# Patient Record
Sex: FEMALE | Race: WHITE | NOT HISPANIC OR LATINO | Employment: UNEMPLOYED | URBAN - METROPOLITAN AREA
[De-identification: names, ages, dates, MRNs, and addresses within clinical notes are randomized per-mention and may not be internally consistent; named-entity substitution may affect disease eponyms.]

---

## 2017-02-08 ENCOUNTER — OFFICE VISIT (OUTPATIENT)
Dept: RETAIL CLINIC | Facility: CLINIC | Age: 32
End: 2017-02-08

## 2017-02-08 VITALS
SYSTOLIC BLOOD PRESSURE: 104 MMHG | TEMPERATURE: 98.2 F | BODY MASS INDEX: 24.08 KG/M2 | RESPIRATION RATE: 18 BRPM | DIASTOLIC BLOOD PRESSURE: 68 MMHG | HEIGHT: 67 IN | HEART RATE: 56 BPM | OXYGEN SATURATION: 99 % | WEIGHT: 153.4 LBS

## 2017-02-08 DIAGNOSIS — J32.9 SINUSITIS, UNSPECIFIED CHRONICITY, UNSPECIFIED LOCATION: Primary | ICD-10-CM

## 2017-02-08 DIAGNOSIS — J40 BRONCHITIS: ICD-10-CM

## 2017-02-08 PROCEDURE — 99202 OFFICE O/P NEW SF 15 MIN: CPT | Performed by: NURSE PRACTITIONER

## 2017-02-08 RX ORDER — PREDNISONE 20 MG/1
20 TABLET ORAL 2 TIMES DAILY
Qty: 6 TABLET | Refills: 0 | Status: SHIPPED | OUTPATIENT
Start: 2017-02-08 | End: 2017-02-11

## 2017-02-08 RX ORDER — DOXYCYCLINE 100 MG/1
100 TABLET ORAL 2 TIMES DAILY
Qty: 14 TABLET | Refills: 0 | Status: SHIPPED | OUTPATIENT
Start: 2017-02-08 | End: 2017-02-09

## 2017-02-08 NOTE — PROGRESS NOTES
Subjective   July Carrington is a 31 y.o. female.   Chief Complaint   Patient presents with   • Earache     bilateral   • Cough     dry   • Sore Throat     swollen gland on the right side   • Nasal Congestion   • Headache     History of Present Illness Complains of sore throat , both ears hurt, cough, congestion, headache for 1 week.  Has taken several different OTC cold/cough meds which haven't helped.    The following portions of the patient's history were reviewed and updated as appropriate: allergies, current medications, past medical history, past social history, past surgical history and problem list.    Review of Systems   Constitutional: Positive for chills and fever (subjective).   HENT: Positive for congestion, ear pain and sore throat.    Respiratory: Positive for cough.    Neurological: Positive for headaches.       Objective   Vitals:    02/08/17 1543   BP: 104/68   Pulse: 56   Resp: 18   Temp: 98.2 °F (36.8 °C)   SpO2: 99%     Physical Exam   Constitutional: She is oriented to person, place, and time. She appears well-developed and well-nourished. She appears ill (mildly).   HENT:   Right Ear: Tympanic membrane is injected.   Left Ear: Tympanic membrane normal.   Nose: Right sinus exhibits no maxillary sinus tenderness and no frontal sinus tenderness. Left sinus exhibits no maxillary sinus tenderness and no frontal sinus tenderness.   Mouth/Throat: Posterior oropharyngeal erythema (thick yellow PND present) present. Tonsils are 0 on the right. Tonsils are 0 on the left.   Eyes: Conjunctivae are normal.   Cardiovascular: Normal rate, regular rhythm and normal heart sounds.    Pulmonary/Chest: No respiratory distress. She has no decreased breath sounds. She has wheezes in the right upper field.   Lymphadenopathy:   Kraig right shotty adenopathy     Neurological: She is alert and oriented to person, place, and time.   Skin: Skin is warm and dry.           Assessment/Plan   July was seen today for  earache, cough, sore throat, nasal congestion and headache.    Diagnoses and all orders for this visit:    Sinusitis, unspecified chronicity, unspecified location    Bronchitis    Other orders  -     doxycycline (ADOXA) 100 MG tablet; Take 1 tablet by mouth 2 (Two) Times a Day for 7 days.  -     predniSONE (DELTASONE) 20 MG tablet; Take 1 tablet by mouth 2 (Two) Times a Day for 3 days.                   Home care instruction reviewed with patient and/or caregiver who acknowledges understanding, questions answered.    Arlin Cohen, APRN

## 2017-02-08 NOTE — PATIENT INSTRUCTIONS
Acute Bronchitis  Bronchitis is when the airways that extend from the windpipe into the lungs get red, puffy, and painful (inflamed). Bronchitis often causes thick spit (mucus) to develop. This leads to a cough. A cough is the most common symptom of bronchitis.  In acute bronchitis, the condition usually begins suddenly and goes away over time (usually in 2 weeks). Smoking, allergies, and asthma can make bronchitis worse. Repeated episodes of bronchitis may cause more lung problems.  HOME CARE  · Rest.  · Drink enough fluids to keep your pee (urine) clear or pale yellow (unless you need to limit fluids as told by your doctor).  · Only take over-the-counter or prescription medicines as told by your doctor.  · Avoid smoking and secondhand smoke. These can make bronchitis worse. If you are a smoker, think about using nicotine gum or skin patches. Quitting smoking will help your lungs heal faster.  · Reduce the chance of getting bronchitis again by:    Washing your hands often.    Avoiding people with cold symptoms.    Trying not to touch your hands to your mouth, nose, or eyes.  · Follow up with your doctor as told.  GET HELP IF:  Your symptoms do not improve after 1 week of treatment. Symptoms include:  · Cough.  · Fever.  · Coughing up thick spit.  · Body aches.  · Chest congestion.  · Chills.  · Shortness of breath.  · Sore throat.  GET HELP RIGHT AWAY IF:   · You have an increased fever.  · You have chills.  · You have severe shortness of breath.  · You have bloody thick spit (sputum).  · You throw up (vomit) often.  · You lose too much body fluid (dehydration).  · You have a severe headache.  · You faint.  MAKE SURE YOU:   · Understand these instructions.  · Will watch your condition.  · Will get help right away if you are not doing well or get worse.     This information is not intended to replace advice given to you by your health care provider. Make sure you discuss any questions you have with your health care  provider.     Document Released: 06/05/2009 Document Revised: 08/20/2014 Document Reviewed: 06/10/2014  [employer] Interactive Patient Education ©2016 [employer]  Sinusitis, Adult  Sinusitis is redness, soreness, and puffiness (inflammation) of the air pockets in the bones of your face (sinuses). The redness, soreness, and puffiness can cause air and mucus to get trapped in your sinuses. This can allow germs to grow and cause an infection.   HOME CARE   · Drink enough fluids to keep your pee (urine) clear or pale yellow.  · Use a humidifier in your home.  · Run a hot shower to create steam in the bathroom. Sit in the bathroom with the door closed. Breathe in the steam 3-4 times a day.  · Put a warm, moist washcloth on your face 3-4 times a day, or as told by your doctor.  · Use salt water sprays (saline sprays) to wet the thick fluid in your nose. This can help the sinuses drain.  · Only take medicine as told by your doctor.  GET HELP RIGHT AWAY IF:   · Your pain gets worse.  · You have very bad headaches.  · You are sick to your stomach (nauseous).  · You throw up (vomit).  · You are very sleepy (drowsy) all the time.  · Your face is puffy (swollen).  · Your vision changes.  · You have a stiff neck.  · You have trouble breathing.  MAKE SURE YOU:   · Understand these instructions.  · Will watch your condition.  · Will get help right away if you are not doing well or get worse.     This information is not intended to replace advice given to you by your health care provider. Make sure you discuss any questions you have with your health care provider.     Document Released: 06/05/2009 Document Revised: 01/08/2016 Document Reviewed: 07/23/2013  [employer] Interactive Patient Education ©2016 [employer]

## 2017-02-09 ENCOUNTER — TELEPHONE (OUTPATIENT)
Dept: RETAIL CLINIC | Facility: CLINIC | Age: 32
End: 2017-02-09

## 2017-02-09 NOTE — TELEPHONE ENCOUNTER
Received communication from pharmacy that doxycycline not covered.  Called patient to discuss alternate treatment. N/A, left voicemail requesting call back.  Arlin Cohen, APRN

## 2017-10-05 ENCOUNTER — TREATMENT (OUTPATIENT)
Dept: PHYSICAL THERAPY | Facility: CLINIC | Age: 32
End: 2017-10-05

## 2017-10-05 PROCEDURE — PDS: Performed by: PHYSICAL THERAPIST

## 2020-09-13 ENCOUNTER — HOSPITAL ENCOUNTER (EMERGENCY)
Facility: HOSPITAL | Age: 35
Discharge: HOME | End: 2020-09-14
Attending: EMERGENCY MEDICINE | Admitting: EMERGENCY MEDICINE

## 2020-09-13 DIAGNOSIS — S05.01XA BILATERAL CORNEAL ABRASIONS, INITIAL ENCOUNTER: Primary | ICD-10-CM

## 2020-09-13 DIAGNOSIS — S05.02XA BILATERAL CORNEAL ABRASIONS, INITIAL ENCOUNTER: Primary | ICD-10-CM

## 2020-09-13 PROCEDURE — 99285 EMERGENCY DEPT VISIT HI MDM: CPT

## 2020-09-13 RX ORDER — ERYTHROMYCIN 5 MG/G
OINTMENT OPHTHALMIC
Qty: 3.5 G | Refills: 0 | Status: SHIPPED | OUTPATIENT
Start: 2020-09-13

## 2020-09-13 RX ORDER — TETRACAINE HYDROCHLORIDE 5 MG/ML
2 SOLUTION OPHTHALMIC ONCE
Status: COMPLETED | OUTPATIENT
Start: 2020-09-13 | End: 2020-09-13

## 2020-09-13 RX ORDER — HYDROCODONE BITARTRATE AND ACETAMINOPHEN 5; 325 MG/1; MG/1
1 TABLET ORAL ONCE
Status: COMPLETED | OUTPATIENT
Start: 2020-09-13 | End: 2020-09-14

## 2020-09-13 RX ADMIN — TETRACAINE HYDROCHLORIDE 2 DROP: 5 SOLUTION OPHTHALMIC at 21:57

## 2020-09-14 VITALS
HEART RATE: 106 BPM | HEIGHT: 68 IN | TEMPERATURE: 98.2 F | OXYGEN SATURATION: 100 % | RESPIRATION RATE: 16 BRPM | SYSTOLIC BLOOD PRESSURE: 120 MMHG | DIASTOLIC BLOOD PRESSURE: 82 MMHG | BODY MASS INDEX: 24.25 KG/M2 | WEIGHT: 160 LBS

## 2020-09-14 RX ADMIN — HYDROCODONE BITARTRATE AND ACETAMINOPHEN 1 TABLET: 5; 325 TABLET ORAL at 00:09

## 2020-09-14 NOTE — ED PROVIDER NOTES
Subjective   July Carrington is a 34 y.o. female who presents to the ED with complaints of bilateral eye burning. Ms. Carrington potentially attributes her discomfort to multiple things: allergies, new makeup, and Peck that she used one day ago to remove hair from her eyebrows. Ms. Carrington has tried using allergy drops, saline drops, and flushing her eyes with no success. She continues to have redness, watering, and discomfort in both eyes. Ms. Carrington does not wear contacts or use corrective lenses. Ms. Carrington has no other pertinent medical or surgical history. She is a current everyday smoker using 1 pack per day.  She does not use drugs or alcohol. Ms. Carrington has no other acute complaints at this time.       History provided by:  Patient   used: No    Eye Pain  Location:  Bilateral eyes  Quality:  Redness, watering, discomfort  Severity:  Mild  Onset quality:  Gradual  Duration:  1 day  Timing:  Constant  Progression:  Unchanged  Chronicity:  New  Context:  Potentially caused by allergies, new makeup, or Peck  Relieved by:  Nothing  Ineffective treatments:  Allergy drops, saline drops, eye flushing  Associated symptoms: no chest pain, no fever and no shortness of breath        Review of Systems   Constitutional: Negative for fever.   Eyes: Positive for pain, discharge (Continued watering) and redness.   Respiratory: Negative for shortness of breath.    Cardiovascular: Negative for chest pain.   All other systems reviewed and are negative.      Past Medical History:   Diagnosis Date   • Allergic    • Anxiety    • Bipolar disorder (CMS/HCC)    • Cancer (CMS/HCC)     cervical   • Depression    • Ear infection    • Liver disease     Hep C       Allergies   Allergen Reactions   • Penicillins Hives       Past Surgical History:   Procedure Laterality Date   •  SECTION  , ,        History reviewed. No pertinent family history.    Social History     Socioeconomic History   • Marital status:       Spouse name: Not on file   • Number of children: Not on file   • Years of education: Not on file   • Highest education level: Not on file   Occupational History   • Occupation: warehouse     Comment: [employer]   Tobacco Use   • Smoking status: Current Every Day Smoker     Packs/day: 1.00     Years: 12.00     Pack years: 12.00   Substance and Sexual Activity   • Alcohol use: No   • Drug use: No         Objective   Physical Exam  Vitals signs and nursing note reviewed.   Constitutional:       Appearance: Normal appearance. She is well-developed. She is not toxic-appearing.   HENT:      Head: Normocephalic and atraumatic.      Nose: Nose normal.      Mouth/Throat:      Mouth: Mucous membranes are moist.   Eyes:      General: Lids are normal.      Conjunctiva/sclera: Conjunctivae normal.      Pupils: Pupils are equal, round, and reactive to light.      Right eye: Corneal abrasion present.      Left eye: Corneal abrasion present.   Neck:      Musculoskeletal: Full passive range of motion without pain and normal range of motion.      Trachea: Trachea normal.   Cardiovascular:      Rate and Rhythm: Regular rhythm.      Pulses: Normal pulses.      Heart sounds: Normal heart sounds.   Pulmonary:      Effort: Pulmonary effort is normal. No respiratory distress.      Breath sounds: Normal breath sounds. No decreased breath sounds, wheezing, rhonchi or rales.   Abdominal:      General: Bowel sounds are normal.      Palpations: Abdomen is soft.      Tenderness: There is no abdominal tenderness.   Musculoskeletal: Normal range of motion.   Skin:     General: Skin is warm and dry.      Findings: No rash.   Neurological:      Mental Status: She is alert and oriented to person, place, and time.      Cranial Nerves: No cranial nerve deficit.   Psychiatric:         Speech: Speech normal.         Behavior: Behavior normal. Behavior is cooperative.         Procedures         ED Course  ED Course as of Sep 14 0423   Sun Sep  13, 2020 2335 The patient tolerated the procedure.  Patient reports that she is feeling better.  Erythromycin ointment was applied.  Patient is encouraged to follow-up with ophthalmology.  Patient agrees with treatment plan and verbalized understanding.        [KG]      ED Course User Index  [KG] Apolonia Vicente, [place]     No results found for this or any previous visit (from the past 24 hour(s)).  Note: In addition to lab results from this visit, the labs listed above may include labs taken at another facility or during a different encounter within the last 24 hours. Please correlate lab times with ED admission and discharge times for further clarification of the services performed during this visit.    No orders to display     Vitals:    09/13/20 2230 09/13/20 2300 09/13/20 2330 09/14/20 0000   BP: 126/89 119/81 127/86 120/82   BP Location:       Patient Position:       Pulse:       Resp:       Temp:       SpO2: 94% 100% 99% 100%   Weight:       Height:         Medications   tetracaine (ALTACAINE) 0.5 % ophthalmic solution 2 drop (2 drops Both Eyes Given 9/13/20 2157)   HYDROcodone-acetaminophen (NORCO) 5-325 MG per tablet 1 tablet (1 tablet Oral Given 9/14/20 0009)     ECG/EMG Results (last 24 hours)     ** No results found for the last 24 hours. **        No orders to display       COVID-19 RISK SCREEN    Has the patient had close contact without PPE with a lab confirmed COVID-19 (+) person or a person under investigation (PUI) for COVID-19 infection?  -- NO    Has the patient had respiratory symptoms, worsened/new cough and/or SOA, unexplained fever, or sudden loss of smell and/or taste in the past 7 days? --  NO    Does the patient have baseline higher exposure risk such as working in healthcare field or currently residing in healthcare facility?  --  NO                                       MDM    Final diagnoses:   Bilateral corneal abrasions, initial encounter       Documentation assistance provided by  gilda Landeros.  Information recorded by the gilda was done at my direction and has been verified and validated by me.     Bonilla Landeros  09/13/20 1449       Apolonia Vicente APRN  09/14/20 [id number]

## 2020-10-27 ENCOUNTER — HOSPITAL ENCOUNTER (EMERGENCY)
Facility: HOSPITAL | Age: 35
Discharge: HOME | End: 2020-10-28
Attending: EMERGENCY MEDICINE | Admitting: EMERGENCY MEDICINE

## 2020-10-27 VITALS
WEIGHT: 160 LBS | TEMPERATURE: 98.1 F | HEART RATE: 78 BPM | HEIGHT: 67 IN | DIASTOLIC BLOOD PRESSURE: 82 MMHG | RESPIRATION RATE: 20 BRPM | SYSTOLIC BLOOD PRESSURE: 126 MMHG | OXYGEN SATURATION: 98 % | BODY MASS INDEX: 25.11 KG/M2

## 2020-10-27 DIAGNOSIS — M54.50 LUMBAR PAIN WITH RADIATION DOWN RIGHT LEG: ICD-10-CM

## 2020-10-27 DIAGNOSIS — M79.604 LUMBAR PAIN WITH RADIATION DOWN RIGHT LEG: ICD-10-CM

## 2020-10-27 DIAGNOSIS — M54.16 LUMBAR RADICULOPATHY, RIGHT: Primary | ICD-10-CM

## 2020-10-27 PROCEDURE — 99282 EMERGENCY DEPT VISIT SF MDM: CPT

## 2020-10-28 ENCOUNTER — APPOINTMENT (OUTPATIENT)
Dept: CT IMAGING | Facility: HOSPITAL | Age: 35
End: 2020-10-28

## 2020-10-28 PROCEDURE — 72131 CT LUMBAR SPINE W/O DYE: CPT

## 2020-10-28 RX ORDER — PREDNISONE 50 MG/1
50 TABLET ORAL DAILY
Qty: 5 TABLET | Refills: 0 | Status: SHIPPED | OUTPATIENT
Start: 2020-10-28 | End: 2020-11-02

## 2021-04-22 ENCOUNTER — APPOINTMENT (OUTPATIENT)
Dept: GENERAL RADIOLOGY | Facility: HOSPITAL | Age: 36
End: 2021-04-22

## 2021-04-22 ENCOUNTER — HOSPITAL ENCOUNTER (EMERGENCY)
Facility: HOSPITAL | Age: 36
Discharge: HOME | End: 2021-04-22
Attending: EMERGENCY MEDICINE | Admitting: EMERGENCY MEDICINE

## 2021-04-22 VITALS
WEIGHT: 150 LBS | HEART RATE: 80 BPM | DIASTOLIC BLOOD PRESSURE: 81 MMHG | RESPIRATION RATE: 18 BRPM | SYSTOLIC BLOOD PRESSURE: 122 MMHG | HEIGHT: 68 IN | OXYGEN SATURATION: 100 % | TEMPERATURE: 99.3 F | BODY MASS INDEX: 22.73 KG/M2

## 2021-04-22 DIAGNOSIS — R51.9 GENERALIZED HEADACHE: ICD-10-CM

## 2021-04-22 DIAGNOSIS — R52 GENERALIZED BODY ACHES: ICD-10-CM

## 2021-04-22 DIAGNOSIS — N39.0 ACUTE UTI: Primary | ICD-10-CM

## 2021-04-22 LAB
ALBUMIN SERPL-MCNC: 4.3 G/DL (ref 3.5–5.2)
ALBUMIN/GLOB SERPL: 1.2 G/DL
ALP SERPL-CCNC: 86 U/L (ref 39–117)
ALT SERPL W P-5'-P-CCNC: 30 U/L (ref 1–33)
ANION GAP SERPL CALCULATED.3IONS-SCNC: 13 MMOL/L (ref 5–15)
AST SERPL-CCNC: 20 U/L (ref 1–32)
BACTERIA UR QL AUTO: ABNORMAL /HPF
BASOPHILS # BLD AUTO: 0.05 10*3/MM3 (ref 0–0.2)
BASOPHILS NFR BLD AUTO: 0.3 % (ref 0–1.5)
BILIRUB SERPL-MCNC: 0.4 MG/DL (ref 0–1.2)
BILIRUB UR QL STRIP: NEGATIVE
BUN SERPL-MCNC: 7 MG/DL (ref 6–20)
BUN/CREAT SERPL: 7.4 (ref 7–25)
CALCIUM SPEC-SCNC: 9.2 MG/DL (ref 8.6–10.5)
CHLORIDE SERPL-SCNC: 94 MMOL/L (ref 98–107)
CLARITY UR: ABNORMAL
CO2 SERPL-SCNC: 22 MMOL/L (ref 22–29)
COLOR UR: YELLOW
CREAT SERPL-MCNC: 0.95 MG/DL (ref 0.57–1)
D-LACTATE SERPL-SCNC: 1.2 MMOL/L (ref 0.5–2)
DEPRECATED RDW RBC AUTO: 40.7 FL (ref 37–54)
EOSINOPHIL # BLD AUTO: 0.02 10*3/MM3 (ref 0–0.4)
EOSINOPHIL NFR BLD AUTO: 0.1 % (ref 0.3–6.2)
ERYTHROCYTE [DISTWIDTH] IN BLOOD BY AUTOMATED COUNT: 12.2 % (ref 12.3–15.4)
FLUAV RNA RESP QL NAA+PROBE: NOT DETECTED
FLUBV RNA RESP QL NAA+PROBE: NOT DETECTED
GFR SERPL CREATININE-BSD FRML MDRD: 66 ML/MIN/1.73
GLOBULIN UR ELPH-MCNC: 3.6 GM/DL
GLUCOSE SERPL-MCNC: 120 MG/DL (ref 65–99)
GLUCOSE UR STRIP-MCNC: NEGATIVE MG/DL
HCT VFR BLD AUTO: 45.8 % (ref 34–46.6)
HGB BLD-MCNC: 15.3 G/DL (ref 12–15.9)
HGB UR QL STRIP.AUTO: ABNORMAL
HOLD SPECIMEN: NORMAL
HOLD SPECIMEN: NORMAL
HYALINE CASTS UR QL AUTO: ABNORMAL /LPF
IMM GRANULOCYTES # BLD AUTO: 0.07 10*3/MM3 (ref 0–0.05)
IMM GRANULOCYTES NFR BLD AUTO: 0.5 % (ref 0–0.5)
KETONES UR QL STRIP: NEGATIVE
LEUKOCYTE ESTERASE UR QL STRIP.AUTO: ABNORMAL
LIPASE SERPL-CCNC: 10 U/L (ref 13–60)
LYMPHOCYTES # BLD AUTO: 1.81 10*3/MM3 (ref 0.7–3.1)
LYMPHOCYTES NFR BLD AUTO: 11.7 % (ref 19.6–45.3)
MCH RBC QN AUTO: 30.1 PG (ref 26.6–33)
MCHC RBC AUTO-ENTMCNC: 33.4 G/DL (ref 31.5–35.7)
MCV RBC AUTO: 90.2 FL (ref 79–97)
MONOCYTES # BLD AUTO: 1.9 10*3/MM3 (ref 0.1–0.9)
MONOCYTES NFR BLD AUTO: 12.3 % (ref 5–12)
NEUTROPHILS NFR BLD AUTO: 11.59 10*3/MM3 (ref 1.7–7)
NEUTROPHILS NFR BLD AUTO: 75.1 % (ref 42.7–76)
NITRITE UR QL STRIP: NEGATIVE
NRBC BLD AUTO-RTO: 0 /100 WBC (ref 0–0.2)
PH UR STRIP.AUTO: <=5 [PH] (ref 5–8)
PLATELET # BLD AUTO: 211 10*3/MM3 (ref 140–450)
PMV BLD AUTO: 10.5 FL (ref 6–12)
POTASSIUM SERPL-SCNC: 3.8 MMOL/L (ref 3.5–5.2)
PROT SERPL-MCNC: 7.9 G/DL (ref 6–8.5)
PROT UR QL STRIP: ABNORMAL
RBC # BLD AUTO: 5.08 10*6/MM3 (ref 3.77–5.28)
RBC # UR: ABNORMAL /HPF
REF LAB TEST METHOD: ABNORMAL
S PYO AG THROAT QL: NEGATIVE
SARS-COV-2 RNA RESP QL NAA+PROBE: NOT DETECTED
SODIUM SERPL-SCNC: 129 MMOL/L (ref 136–145)
SP GR UR STRIP: 1.02 (ref 1–1.03)
SQUAMOUS #/AREA URNS HPF: ABNORMAL /HPF
UROBILINOGEN UR QL STRIP: ABNORMAL
WBC # BLD AUTO: 15.44 10*3/MM3 (ref 3.4–10.8)
WBC UR QL AUTO: ABNORMAL /HPF
WHOLE BLOOD HOLD SPECIMEN: NORMAL
WHOLE BLOOD HOLD SPECIMEN: NORMAL

## 2021-04-22 PROCEDURE — 96375 TX/PRO/DX INJ NEW DRUG ADDON: CPT

## 2021-04-22 PROCEDURE — 87636 SARSCOV2 & INF A&B AMP PRB: CPT | Performed by: PHYSICIAN ASSISTANT

## 2021-04-22 PROCEDURE — 25010000002 DIPHENHYDRAMINE PER 50 MG: Performed by: PHYSICIAN ASSISTANT

## 2021-04-22 PROCEDURE — 71045 X-RAY EXAM CHEST 1 VIEW: CPT

## 2021-04-22 PROCEDURE — 83605 ASSAY OF LACTIC ACID: CPT | Performed by: PHYSICIAN ASSISTANT

## 2021-04-22 PROCEDURE — 81001 URINALYSIS AUTO W/SCOPE: CPT | Performed by: PHYSICIAN ASSISTANT

## 2021-04-22 PROCEDURE — 25010000002 KETOROLAC TROMETHAMINE PER 15 MG: Performed by: PHYSICIAN ASSISTANT

## 2021-04-22 PROCEDURE — 25010000002 CEFTRIAXONE PER 250 MG: Performed by: PHYSICIAN ASSISTANT

## 2021-04-22 PROCEDURE — 83690 ASSAY OF LIPASE: CPT | Performed by: PHYSICIAN ASSISTANT

## 2021-04-22 PROCEDURE — 87081 CULTURE SCREEN ONLY: CPT | Performed by: PHYSICIAN ASSISTANT

## 2021-04-22 PROCEDURE — 87880 STREP A ASSAY W/OPTIC: CPT | Performed by: PHYSICIAN ASSISTANT

## 2021-04-22 PROCEDURE — 96365 THER/PROPH/DIAG IV INF INIT: CPT

## 2021-04-22 PROCEDURE — 85025 COMPLETE CBC W/AUTO DIFF WBC: CPT | Performed by: PHYSICIAN ASSISTANT

## 2021-04-22 PROCEDURE — 25010000002 METOCLOPRAMIDE PER 10 MG: Performed by: PHYSICIAN ASSISTANT

## 2021-04-22 PROCEDURE — 99284 EMERGENCY DEPT VISIT MOD MDM: CPT

## 2021-04-22 PROCEDURE — 80053 COMPREHEN METABOLIC PANEL: CPT | Performed by: PHYSICIAN ASSISTANT

## 2021-04-22 RX ORDER — DIPHENHYDRAMINE HYDROCHLORIDE 50 MG/ML
25 INJECTION INTRAMUSCULAR; INTRAVENOUS ONCE
Status: COMPLETED | OUTPATIENT
Start: 2021-04-22 | End: 2021-04-22

## 2021-04-22 RX ORDER — CEFDINIR 300 MG/1
300 CAPSULE ORAL 2 TIMES DAILY
Qty: 14 CAPSULE | Refills: 0 | Status: SHIPPED | OUTPATIENT
Start: 2021-04-22 | End: 2021-04-29

## 2021-04-22 RX ORDER — SODIUM CHLORIDE 0.9 % (FLUSH) 0.9 %
10 SYRINGE (ML) INJECTION AS NEEDED
Status: DISCONTINUED | OUTPATIENT
Start: 2021-04-22 | End: 2021-04-23 | Stop reason: HOSPADM

## 2021-04-22 RX ORDER — KETOROLAC TROMETHAMINE 15 MG/ML
15 INJECTION, SOLUTION INTRAMUSCULAR; INTRAVENOUS ONCE
Status: COMPLETED | OUTPATIENT
Start: 2021-04-22 | End: 2021-04-22

## 2021-04-22 RX ORDER — METOCLOPRAMIDE HYDROCHLORIDE 5 MG/ML
10 INJECTION INTRAMUSCULAR; INTRAVENOUS ONCE
Status: COMPLETED | OUTPATIENT
Start: 2021-04-22 | End: 2021-04-22

## 2021-04-22 RX ADMIN — METOCLOPRAMIDE 10 MG: 5 INJECTION, SOLUTION INTRAMUSCULAR; INTRAVENOUS at 20:36

## 2021-04-22 RX ADMIN — SODIUM CHLORIDE 1 G: 900 INJECTION INTRAVENOUS at 22:34

## 2021-04-22 RX ADMIN — SODIUM CHLORIDE 1000 ML: 9 INJECTION, SOLUTION INTRAVENOUS at 20:37

## 2021-04-22 RX ADMIN — KETOROLAC TROMETHAMINE 15 MG: 15 INJECTION, SOLUTION INTRAMUSCULAR; INTRAVENOUS at 20:36

## 2021-04-22 RX ADMIN — DIPHENHYDRAMINE HYDROCHLORIDE 25 MG: 50 INJECTION INTRAMUSCULAR; INTRAVENOUS at 20:36

## 2021-04-23 LAB — HOLD SPECIMEN: NORMAL

## 2021-04-23 NOTE — DISCHARGE INSTRUCTIONS
Symptomatic care is recommended. Take all medications as prescribed and instructed.  Take and complete full course of antibiotics as prescribed.  Follow up with primary care as directed or return to Emergency Department with worsening of symptoms.

## 2021-04-23 NOTE — ED PROVIDER NOTES
Subjective   Patient presents to ED with complaints of generalized headache and fever.  Patient shares that her symptoms have been going on for approximately the last 2 days.  She states also that she has had pain in her ears, throat and body aches for approximately 1 week.  Patient has no history of headaches.  She denies anything specific that seems to make her symptoms better or worse.  She reports no additional associated symptoms on interview and exam.          Review of Systems   Constitutional: Positive for fever. Negative for activity change and chills.   HENT: Positive for ear pain and sore throat.    Eyes: Negative.    Respiratory: Negative for cough and shortness of breath.    Cardiovascular: Negative.    Gastrointestinal: Negative.    Genitourinary: Negative.    Musculoskeletal: Positive for arthralgias and myalgias.   Skin: Negative.    Neurological: Positive for headaches.   All other systems reviewed and are negative.      Past Medical History:   Diagnosis Date   • Allergic    • Anxiety    • Bipolar disorder (CMS/HCC)    • Cancer (CMS/[facility])     cervical   • Depression    • Ear infection    • Liver disease     Hep C       Allergies   Allergen Reactions   • Penicillins Hives       Past Surgical History:   Procedure Laterality Date   •  SECTION  , ,        History reviewed. No pertinent family history.    Social History     Socioeconomic History   • Marital status:      Spouse name: Not on file   • Number of children: Not on file   • Years of education: Not on file   • Highest education level: Not on file   Tobacco Use   • Smoking status: Current Every Day Smoker     Packs/day: 0.50     Years: 12.00     Pack years: 6.00   Substance and Sexual Activity   • Alcohol use: No   • Drug use: Yes     Types: Marijuana           Objective   Physical Exam  Vitals and nursing note reviewed.   Constitutional:       General: She is not in acute distress.     Appearance: She is  well-developed. She is not ill-appearing or toxic-appearing.   HENT:      Head: Normocephalic and atraumatic.      Right Ear: Tympanic membrane, ear canal and external ear normal.      Left Ear: Tympanic membrane, ear canal and external ear normal.      Nose: Nose normal.      Mouth/Throat:      Mouth: Mucous membranes are moist.      Pharynx: Posterior oropharyngeal erythema present. No oropharyngeal exudate.   Eyes:      Extraocular Movements: Extraocular movements intact.      Conjunctiva/sclera: Conjunctivae normal.   Cardiovascular:      Rate and Rhythm: Normal rate and regular rhythm.   Pulmonary:      Effort: Pulmonary effort is normal. No respiratory distress.      Breath sounds: Normal breath sounds.   Abdominal:      General: There is no distension.      Palpations: Abdomen is soft.      Tenderness: There is no abdominal tenderness.   Musculoskeletal:         General: Normal range of motion.      Cervical back: Normal range of motion and neck supple.   Skin:     General: Skin is warm and dry.   Neurological:      General: No focal deficit present.      Mental Status: She is alert.   Psychiatric:         Behavior: Behavior normal.         Thought Content: Thought content normal.         Judgment: Judgment normal.         Procedures           ED Course  ED Course as of Apr 22 2255   Thu Apr 22, 2021 2129 On reassessment patient is resting comfortably, no acute distress, vital signs stable.  No pain.    [JG]   4587 On reassessment and review of work-up with patient she continues to rest comfortably, no acute distress, headache resolved.    [JG]   3836 Patient presents to ED for evaluation of generalized body aches, fever for the last 2 days.  No acute or emergent findings on physical exam.  Urinalysis consistent with urinary tract infection with leukocytosis.  Patient responded well to IV fluids and headache cocktail administered in ED with resolution of symptoms.  Patient given initial dose of antibiotics  for her UTI while in the ED and will be prescribed additional antibiotics for outpatient treatment.  At time of discharge disposition patient was resting comfortably, no acute distress, vital signs stable and maintaining oxygen saturation of 100% on room air.  Patient is agreeable to plan of care of outpatient follow-up, provided return precautions and showed understanding.    [JG]      ED Course User Index  [JG] Caleb Martins, PA      Recent Results (from the past 24 hour(s))   Light Blue Top    Collection Time: 04/22/21  7:53 PM   Result Value Ref Range    Extra Tube hold for add-on    Green Top (Gel)    Collection Time: 04/22/21  7:53 PM   Result Value Ref Range    Extra Tube Hold for add-ons.    Lavender Top    Collection Time: 04/22/21  7:53 PM   Result Value Ref Range    Extra Tube hold for add-on    Comprehensive Metabolic Panel    Collection Time: 04/22/21  7:53 PM    Specimen: Blood   Result Value Ref Range    Glucose 120 (H) 65 - 99 mg/dL    BUN 7 6 - 20 mg/dL    Creatinine 0.95 0.57 - 1.00 mg/dL    Sodium 129 (L) 136 - 145 mmol/L    Potassium 3.8 3.5 - 5.2 mmol/L    Chloride 94 (L) 98 - 107 mmol/L    CO2 22.0 22.0 - 29.0 mmol/L    Calcium 9.2 8.6 - 10.5 mg/dL    Total Protein 7.9 6.0 - 8.5 g/dL    Albumin 4.30 3.50 - 5.20 g/dL    ALT (SGPT) 30 1 - 33 U/L    AST (SGOT) 20 1 - 32 U/L    Alkaline Phosphatase 86 39 - 117 U/L    Total Bilirubin 0.4 0.0 - 1.2 mg/dL    eGFR Non African Amer 66 >60 mL/min/1.73    Globulin 3.6 gm/dL    A/G Ratio 1.2 g/dL    BUN/Creatinine Ratio 7.4 7.0 - 25.0    Anion Gap 13.0 5.0 - 15.0 mmol/L   Lipase    Collection Time: 04/22/21  7:53 PM    Specimen: Blood   Result Value Ref Range    Lipase 10 (L) 13 - 60 U/L   Lactic Acid, Plasma    Collection Time: 04/22/21  7:53 PM    Specimen: Blood   Result Value Ref Range    Lactate 1.2 0.5 - 2.0 mmol/L   CBC Auto Differential    Collection Time: 04/22/21  7:53 PM    Specimen: Blood   Result Value Ref Range    WBC 15.44 (H) 3.40 - 10.80  10*3/mm3    RBC 5.08 3.77 - 5.28 10*6/mm3    Hemoglobin 15.3 12.0 - 15.9 g/dL    Hematocrit 45.8 34.0 - 46.6 %    MCV 90.2 79.0 - 97.0 fL    MCH 30.1 26.6 - 33.0 pg    MCHC 33.4 31.5 - 35.7 g/dL    RDW 12.2 (L) 12.3 - 15.4 %    RDW-SD 40.7 37.0 - 54.0 fl    MPV 10.5 6.0 - 12.0 fL    Platelets 211 140 - 450 10*3/mm3    Neutrophil % 75.1 42.7 - 76.0 %    Lymphocyte % 11.7 (L) 19.6 - 45.3 %    Monocyte % 12.3 (H) 5.0 - 12.0 %    Eosinophil % 0.1 (L) 0.3 - 6.2 %    Basophil % 0.3 0.0 - 1.5 %    Immature Grans % 0.5 0.0 - 0.5 %    Neutrophils, Absolute 11.59 (H) 1.70 - 7.00 10*3/mm3    Lymphocytes, Absolute 1.81 0.70 - 3.10 10*3/mm3    Monocytes, Absolute 1.90 (H) 0.10 - 0.90 10*3/mm3    Eosinophils, Absolute 0.02 0.00 - 0.40 10*3/mm3    Basophils, Absolute 0.05 0.00 - 0.20 10*3/mm3    Immature Grans, Absolute 0.07 (H) 0.00 - 0.05 10*3/mm3    nRBC 0.0 0.0 - 0.2 /100 WBC   Gold Top - SST    Collection Time: 04/22/21  7:54 PM   Result Value Ref Range    Extra Tube Hold for add-ons.    Urinalysis With Microscopic If Indicated (No Culture) - Urine, Clean Catch    Collection Time: 04/22/21  8:37 PM    Specimen: Urine, Clean Catch   Result Value Ref Range    Color, UA Yellow Yellow, Straw    Appearance, UA Turbid (A) Clear    pH, UA <=5.0 5.0 - 8.0    Specific Gravity, UA 1.018 1.001 - 1.030    Glucose, UA Negative Negative    Ketones, UA Negative Negative    Bilirubin, UA Negative Negative    Blood, UA Small (1+) (A) Negative    Protein,  mg/dL (2+) (A) Negative    Leuk Esterase, UA Large (3+) (A) Negative    Nitrite, UA Negative Negative    Urobilinogen, UA 1.0 E.U./dL 0.2 - 1.0 E.U./dL   COVID-19 and FLU A/B PCR - Swab, Nasopharynx    Collection Time: 04/22/21  8:37 PM    Specimen: Nasopharynx; Swab   Result Value Ref Range    COVID19 Not Detected Not Detected - Ref. Range    Influenza A PCR Not Detected Not Detected    Influenza B PCR Not Detected Not Detected   Rapid Strep A Screen - Swab, Throat    Collection Time:  "04/22/21  8:37 PM    Specimen: Throat; Swab   Result Value Ref Range    Strep A Ag Negative Negative   Urinalysis, Microscopic Only - Urine, Clean Catch    Collection Time: 04/22/21  8:37 PM    Specimen: Urine, Clean Catch   Result Value Ref Range    RBC, UA 7-12 (A) None Seen, 0-2 /HPF    WBC, UA Too Numerous to Count (A) None Seen, 0-2 /HPF    Bacteria, UA 3+ (A) None Seen, Trace /HPF    Squamous Epithelial Cells, UA 3-6 (A) None Seen, 0-2 /HPF    Hyaline Casts, UA 7-12 0 - 6 /LPF    Methodology Manual Light Microscopy      Note: In addition to lab results from this visit, the labs listed above may include labs taken at another facility or during a different encounter within the last 24 hours. Please correlate lab times with ED admission and discharge times for further clarification of the services performed during this visit.    XR Chest 1 View   Final Result   No acute cardiopulmonary findings.      Signer Name: Grant Pretty MD    Signed: 4/22/2021 8:50 PM    Workstation Name: [id number]     [facility]        Vitals:    04/22/21 1942 04/22/21 2115   BP: 115/78    BP Location: Left arm    Patient Position: Sitting    Pulse: (!) 139 91   Resp: 22    Temp: 99.3 °F (37.4 °C)    TempSrc: Oral    SpO2: 100% 100%   Weight: 68 kg (150 lb)    Height: 172.7 cm (68\")      Medications   sodium chloride 0.9 % flush 10 mL (has no administration in time range)   sodium chloride 0.9 % flush 10 mL (has no administration in time range)   cefTRIAXone (ROCEPHIN) 1 g/100 mL 0.9% NS (MBP) (1 g Intravenous New Bag 4/22/21 2234)   sodium chloride 0.9 % bolus 1,000 mL (0 mL Intravenous Stopped 4/22/21 2118)   ketorolac (TORADOL) injection 15 mg (15 mg Intravenous Given 4/22/21 2036)   metoclopramide (REGLAN) injection 10 mg (10 mg Intravenous Given 4/22/21 2036)   diphenhydrAMINE (BENADRYL) injection 25 mg (25 mg Intravenous Given 4/22/21 2036)     ECG/EMG Results (last 24 hours)     ** No results found for " the last 24 hours. **        No orders to display                                          MDM  Number of Diagnoses or Management Options  Acute UTI: new and requires workup  Generalized body aches: new and requires workup  Generalized headache: new and requires workup     Amount and/or Complexity of Data Reviewed  Clinical lab tests: reviewed  Tests in the radiology section of CPT®: reviewed    Risk of Complications, Morbidity, and/or Mortality  Presenting problems: moderate  Diagnostic procedures: moderate  Management options: moderate    Patient Progress  Patient progress: improved      Final diagnoses:   Acute UTI   Generalized headache   Generalized body aches       ED Disposition  ED Disposition     ED Disposition Condition Comment    Discharge Stable           PATIENT CONNECTION - [place] [id number]  [phone]  Schedule an appointment as soon as possible for a visit       [facility] Emergency Department  [address] [address] [phone]  Go to   If symptoms worsen         Medication List      New Prescriptions    cefdinir 300 MG capsule  Commonly known as: OMNICEF  Take 1 capsule by mouth 2 (Two) Times a Day for 7 days.           Where to Get Your Medications      These medications were sent to [facility]/pharmacy #[id number] - [place], [place] - [address]  - [phone]  - [phone] FX  [address] [facility] [address]    Hours: 24-hours Phone: [phone]   · cefdinir 300 MG capsule          Caleb Martins PA  04/22/21 4664

## 2021-04-24 LAB — BACTERIA SPEC AEROBE CULT: NORMAL

## 2021-07-06 ENCOUNTER — HOSPITAL ENCOUNTER (EMERGENCY)
Facility: HOSPITAL | Age: 36
Discharge: HOME | End: 2021-07-07
Attending: EMERGENCY MEDICINE | Admitting: EMERGENCY MEDICINE

## 2021-07-06 VITALS
TEMPERATURE: 97.9 F | SYSTOLIC BLOOD PRESSURE: 127 MMHG | HEART RATE: 84 BPM | WEIGHT: 140 LBS | HEIGHT: 67 IN | RESPIRATION RATE: 20 BRPM | OXYGEN SATURATION: 93 % | BODY MASS INDEX: 21.97 KG/M2 | DIASTOLIC BLOOD PRESSURE: 92 MMHG

## 2021-07-06 DIAGNOSIS — R51.9 ACUTE NONINTRACTABLE HEADACHE, UNSPECIFIED HEADACHE TYPE: ICD-10-CM

## 2021-07-06 DIAGNOSIS — T40.2X1A OPIOID OVERDOSE, ACCIDENTAL OR UNINTENTIONAL, INITIAL ENCOUNTER (HCC): Primary | ICD-10-CM

## 2021-07-06 DIAGNOSIS — F19.10 DRUG ABUSE (HCC): ICD-10-CM

## 2021-07-06 DIAGNOSIS — R11.2 NAUSEA AND VOMITING, INTRACTABILITY OF VOMITING NOT SPECIFIED, UNSPECIFIED VOMITING TYPE: ICD-10-CM

## 2021-07-06 LAB
HOLD SPECIMEN: NORMAL
HOLD SPECIMEN: NORMAL
WHOLE BLOOD HOLD SPECIMEN: NORMAL

## 2021-07-06 PROCEDURE — 99283 EMERGENCY DEPT VISIT LOW MDM: CPT

## 2021-07-06 PROCEDURE — 63710000001 ONDANSETRON ODT 4 MG TABLET DISPERSIBLE: Performed by: EMERGENCY MEDICINE

## 2021-07-06 RX ORDER — ACETAMINOPHEN 500 MG
1000 TABLET ORAL ONCE
Status: COMPLETED | OUTPATIENT
Start: 2021-07-06 | End: 2021-07-06

## 2021-07-06 RX ORDER — SODIUM CHLORIDE 0.9 % (FLUSH) 0.9 %
10 SYRINGE (ML) INJECTION AS NEEDED
Status: DISCONTINUED | OUTPATIENT
Start: 2021-07-06 | End: 2021-07-07 | Stop reason: HOSPADM

## 2021-07-06 RX ORDER — ONDANSETRON 4 MG/1
8 TABLET, ORALLY DISINTEGRATING ORAL ONCE
Status: COMPLETED | OUTPATIENT
Start: 2021-07-06 | End: 2021-07-06

## 2021-07-06 RX ORDER — ACETAMINOPHEN 325 MG/1
650 TABLET ORAL EVERY 6 HOURS PRN
COMMUNITY

## 2021-07-06 RX ADMIN — ONDANSETRON 8 MG: 4 TABLET, ORALLY DISINTEGRATING ORAL at 22:13

## 2021-07-06 RX ADMIN — ACETAMINOPHEN 1000 MG: 500 TABLET, FILM COATED ORAL at 22:13

## 2021-07-07 LAB — HOLD SPECIMEN: NORMAL

## 2021-07-07 NOTE — ED PROVIDER NOTES
[place]    EMERGENCY DEPARTMENT ENCOUNTER      Pt Name: July Carrington  MRN: [id number]  YOB: 1985  Date of evaluation: 2021  Provider: Sedrick Schroeder MD    CHIEF COMPLAINT       Chief Complaint   Patient presents with   • Drug Overdose         HISTORY OF PRESENT ILLNESS  (Location/Symptom, Timing/Onset, Context/Setting, Quality, Duration, Modifying Factors, Severity.)   July Carrington is a 35 y.o. female who presents to the emergency department after apparent drug overdose.  She does have a history of opioid abuse but states that she has been clean from this for a long time.  She states that she was snorting what she thought was a line of cocaine earlier but believes it was actually heroin.  She subsequently became unresponsive and Narcan was administered by a friend with resolution of her symptoms.  She is asymptomatic at this time.  She denies any additional substances ingested.      Nursing notes were reviewed.    REVIEW OF SYSTEMS    (2-9 systems for level 4, 10 or more for level 5)   ROS:  General:  No fevers, no chills, no weakness  Cardiovascular:  No chest pain, no palpitations  Respiratory:  No shortness of breath, no cough, no wheezing  Gastrointestinal:  No pain, no nausea, no vomiting, no diarrhea  Musculoskeletal:  No muscle pain, no joint pain  Skin:  No rash  Neurologic:  No speech problems, no headache, no extremity numbness, no extremity tingling, no extremity weakness  Psychiatric:  No anxiety  Genitourinary:  No dysuria, no hematuria    Except as noted above the remainder of the review of systems was reviewed and negative.       PAST MEDICAL HISTORY     Past Medical History:   Diagnosis Date   • Allergic    • Anxiety    • Bipolar disorder (CMS/HCC)    • Cancer (CMS/HCC)     cervical   • Depression    • Ear infection    • Liver disease     Hep C         SURGICAL HISTORY       Past Surgical History:   Procedure Laterality Date   •  SECTION  , ,   "        CURRENT MEDICATIONS     No current facility-administered medications for this encounter.    Current Outpatient Medications:   •  acetaminophen (TYLENOL) 325 MG tablet, Take 650 mg by mouth Every 6 (Six) Hours As Needed for Mild Pain ., Disp: , Rfl:   •  erythromycin (ROMYCIN) 5 MG/GM ophthalmic ointment, Administer  to the right eye Every 4 (Four) Hours While Awake., Disp: 3.5 g, Rfl: 0    ALLERGIES     Penicillins    FAMILY HISTORY     History reviewed. No pertinent family history.       SOCIAL HISTORY       Social History     Socioeconomic History   • Marital status:      Spouse name: Not on file   • Number of children: Not on file   • Years of education: Not on file   • Highest education level: Not on file   Tobacco Use   • Smoking status: Current Every Day Smoker     Packs/day: 0.50     Years: 12.00     Pack years: 6.00   • Smokeless tobacco: Never Used   Substance and Sexual Activity   • Alcohol use: No   • Drug use: Yes     Types: Marijuana, Cocaine(coke)     Comment: UNKNOWN SUBSTANCES         PHYSICAL EXAM    (up to 7 for level 4, 8 or more for level 5)     Vitals:    07/06/21 2112 07/06/21 2123 07/06/21 2330   BP: (!) 87/51 138/96 127/92   BP Location: Right arm Right arm    Patient Position: Sitting     Pulse: (!) 121  84   Resp: 20     Temp: 97.9 °F (36.6 °C)     TempSrc: Oral     SpO2: 98%  93%   Weight: 63.5 kg (140 lb)     Height: 170.2 cm (67\")         Physical Exam  General: Awake, alert, no acute distress.  HEENT: Conjunctivae normal.  Neck: Trachea midline.  Cardiac: Heart regular rate, rhythm, no murmurs, rubs, or gallops  Lungs: Lungs are clear to auscultation, there is no wheezing, rhonchi, or rales. There is no use of accessory muscles.  Chest wall: There is no tenderness to palpation over the chest wall or over ribs  Abdomen: Abdomen is soft, nontender, nondistended. There are no firm or pulsatile masses, no rebound rigidity or guarding.   Musculoskeletal: No deformity.  Neuro: " "Alert and oriented x 4.  Dermatology: Skin is warm and dry  Psych: Mentation is grossly normal, cognition is grossly normal. Affect is appropriate.          EMERGENCY DEPARTMENT COURSE and DIFFERENTIAL DIAGNOSIS/MDM:   Vitals:    Vitals:    07/06/21 2112 07/06/21 2123 07/06/21 2330   BP: (!) 87/51 138/96 127/92   BP Location: Right arm Right arm    Patient Position: Sitting     Pulse: (!) 121  84   Resp: 20     Temp: 97.9 °F (36.6 °C)     TempSrc: Oral     SpO2: 98%  93%   Weight: 63.5 kg (140 lb)     Height: 170.2 cm (67\")         ED Course as of Jul 18 0242 Tue Jul 06, 2021   4083 Patient very well-appearing on reevaluation. She is breathing comfortably and is saturating well on room air and is requesting discharge at this time. I counseled her regarding cessation of drug use.    [NS]      ED Course User Index  [NS] Sedrick Schroeder MD       Patient well-appearing and completely asymptomatic on evaluation in the emergency department.  Her history of presentation are consistent with acute opioid overdose that was resolved with Narcan prior to arrival to the emergency department.  Given history, there is no evidence of other drug ingestion.  Her oxygen levels are normal and there is no suggestion of associated pulmonary edema.    I had a discussion with the patient/family regarding diagnosis, diagnostic results, treatment plan, and medications.  The patient/family indicated understanding of these instructions.  I spent adequate time at the bedside preceding discharge necessary to personally discuss the aftercare instructions, giving patient education, providing explanations of the results of our evaluations/findings, and my decision making to assure that the patient/family understand the plan of care.  Time was allotted to answer questions at that time and throughout the ED course.  Emphasis was placed on timely follow-up after discharge.  I also discussed the potential for the development of an acute emergent " condition requiring further evaluation, admission, or even surgical intervention. I discussed that we found nothing during the visit today indicating the need for further workup, admission, or the presence of an unstable medical condition.  I encouraged the patient to return to the emergency department immediately for ANY concerns, worsening, new complaints, or if symptoms persist and unable to seek follow-up in a timely fashion.  The patient/family expressed understanding and agreement with this plan.  The patient will follow-up with their PCP in 1-2 days for reevaluation.       MEDICATIONS ADMINISTERED IN ED:  Medications   acetaminophen (TYLENOL) tablet 1,000 mg (1,000 mg Oral Given 7/6/21 2213)   ondansetron ODT (ZOFRAN-ODT) disintegrating tablet 8 mg (8 mg Oral Given 7/6/21 2213)         FINAL IMPRESSION      1. Opioid overdose, accidental or unintentional, initial encounter (CMS/[facility])    2. Nausea and vomiting, intractability of vomiting not specified, unspecified vomiting type    3. Acute nonintractable headache, unspecified headache type    4. Drug abuse (CMS/[facility])          DISPOSITION/PLAN     ED Disposition     ED Disposition Condition Comment    Discharge Stable           PATIENT REFERRED TO:  Provider, No Known  [place] [address]    Schedule an appointment as soon as possible for a visit in 2 days      [facility] Emergency Department  [address] [facility] [address] [address] [phone]    If symptoms worsen      DISCHARGE MEDICATIONS:     Medication List      CONTINUE taking these medications    acetaminophen 325 MG tablet  Commonly known as: TYLENOL     erythromycin 5 MG/GM ophthalmic ointment  Commonly known as: ROMYCIN  Administer  to the right eye Every 4 (Four) Hours While Awake.                Comment: Please note this report has been produced using speech recognition software.      Sedrick Schroeder MD  Attending Emergency Physician                Sedrick Schroeder MD  07/18/21 8926

## 2024-02-01 ENCOUNTER — OFFICE VISIT (OUTPATIENT)
Dept: FAMILY MEDICINE CLINIC | Facility: CLINIC | Age: 39
End: 2024-02-01
Payer: MEDICAID

## 2024-02-01 ENCOUNTER — LAB (OUTPATIENT)
Dept: LAB | Facility: HOSPITAL | Age: 39
End: 2024-02-01
Payer: MEDICAID

## 2024-02-01 VITALS
DIASTOLIC BLOOD PRESSURE: 74 MMHG | HEART RATE: 83 BPM | WEIGHT: 168 LBS | HEIGHT: 67 IN | BODY MASS INDEX: 26.37 KG/M2 | SYSTOLIC BLOOD PRESSURE: 110 MMHG | OXYGEN SATURATION: 99 %

## 2024-02-01 DIAGNOSIS — N89.8 VAGINAL DISCHARGE: ICD-10-CM

## 2024-02-01 DIAGNOSIS — B18.2 CHRONIC HEPATITIS C WITHOUT HEPATIC COMA: ICD-10-CM

## 2024-02-01 DIAGNOSIS — Z00.00 ANNUAL PHYSICAL EXAM: ICD-10-CM

## 2024-02-01 DIAGNOSIS — G89.29 CHRONIC BILATERAL LOW BACK PAIN WITHOUT SCIATICA: ICD-10-CM

## 2024-02-01 DIAGNOSIS — Z00.00 ANNUAL PHYSICAL EXAM: Primary | ICD-10-CM

## 2024-02-01 DIAGNOSIS — M54.50 CHRONIC BILATERAL LOW BACK PAIN WITHOUT SCIATICA: ICD-10-CM

## 2024-02-01 DIAGNOSIS — N30.00 ACUTE CYSTITIS WITHOUT HEMATURIA: ICD-10-CM

## 2024-02-01 DIAGNOSIS — Z23 ENCOUNTER FOR IMMUNIZATION: ICD-10-CM

## 2024-02-01 DIAGNOSIS — C53.9 MALIGNANT NEOPLASM OF CERVIX, UNSPECIFIED SITE: ICD-10-CM

## 2024-02-01 LAB
ALBUMIN SERPL-MCNC: 4.3 G/DL (ref 3.5–5.2)
ALBUMIN/GLOB SERPL: 1.5 G/DL
ALP SERPL-CCNC: 71 U/L (ref 39–117)
ALT SERPL W P-5'-P-CCNC: 108 U/L (ref 1–33)
ANION GAP SERPL CALCULATED.3IONS-SCNC: 12.3 MMOL/L (ref 5–15)
AST SERPL-CCNC: 103 U/L (ref 1–32)
BILIRUB SERPL-MCNC: 0.4 MG/DL (ref 0–1.2)
BUN SERPL-MCNC: 14 MG/DL (ref 6–20)
BUN/CREAT SERPL: 19.2 (ref 7–25)
CALCIUM SPEC-SCNC: 9.4 MG/DL (ref 8.6–10.5)
CHLORIDE SERPL-SCNC: 104 MMOL/L (ref 98–107)
CHOLEST SERPL-MCNC: 162 MG/DL (ref 0–200)
CO2 SERPL-SCNC: 21.7 MMOL/L (ref 22–29)
CREAT SERPL-MCNC: 0.73 MG/DL (ref 0.57–1)
DEPRECATED RDW RBC AUTO: 39.8 FL (ref 37–54)
EGFRCR SERPLBLD CKD-EPI 2021: 106.8 ML/MIN/1.73
ERYTHROCYTE [DISTWIDTH] IN BLOOD BY AUTOMATED COUNT: 12.3 % (ref 12.3–15.4)
GLOBULIN UR ELPH-MCNC: 2.8 GM/DL
GLUCOSE SERPL-MCNC: 94 MG/DL (ref 65–99)
HBA1C MFR BLD: 5 % (ref 4.8–5.6)
HCT VFR BLD AUTO: 35.8 % (ref 34–46.6)
HDLC SERPL-MCNC: 72 MG/DL (ref 40–60)
HGB BLD-MCNC: 12.1 G/DL (ref 12–15.9)
HIV 1+2 AB+HIV1 P24 AG SERPL QL IA: NORMAL
LDLC SERPL CALC-MCNC: 82 MG/DL (ref 0–100)
LDLC/HDLC SERPL: 1.16 {RATIO}
MCH RBC QN AUTO: 30.2 PG (ref 26.6–33)
MCHC RBC AUTO-ENTMCNC: 33.8 G/DL (ref 31.5–35.7)
MCV RBC AUTO: 89.3 FL (ref 79–97)
PLATELET # BLD AUTO: 254 10*3/MM3 (ref 140–450)
PMV BLD AUTO: 10.1 FL (ref 6–12)
POTASSIUM SERPL-SCNC: 4.4 MMOL/L (ref 3.5–5.2)
PROT SERPL-MCNC: 7.1 G/DL (ref 6–8.5)
RBC # BLD AUTO: 4.01 10*6/MM3 (ref 3.77–5.28)
SODIUM SERPL-SCNC: 138 MMOL/L (ref 136–145)
TRIGL SERPL-MCNC: 33 MG/DL (ref 0–150)
VLDLC SERPL-MCNC: 8 MG/DL (ref 5–40)
WBC NRBC COR # BLD AUTO: 6.01 10*3/MM3 (ref 3.4–10.8)

## 2024-02-01 PROCEDURE — 87902 NFCT AGT GNTYP ALYS HEP C: CPT

## 2024-02-01 PROCEDURE — 80061 LIPID PANEL: CPT

## 2024-02-01 PROCEDURE — G0432 EIA HIV-1/HIV-2 SCREEN: HCPCS

## 2024-02-01 PROCEDURE — 87522 HEPATITIS C REVRS TRNSCRPJ: CPT

## 2024-02-01 PROCEDURE — 87086 URINE CULTURE/COLONY COUNT: CPT | Performed by: FAMILY MEDICINE

## 2024-02-01 PROCEDURE — 83036 HEMOGLOBIN GLYCOSYLATED A1C: CPT

## 2024-02-01 PROCEDURE — 36415 COLL VENOUS BLD VENIPUNCTURE: CPT

## 2024-02-01 PROCEDURE — 80050 GENERAL HEALTH PANEL: CPT

## 2024-02-01 RX ORDER — METRONIDAZOLE 500 MG/1
500 TABLET ORAL 2 TIMES DAILY
Qty: 14 TABLET | Refills: 0 | Status: SHIPPED | OUTPATIENT
Start: 2024-02-01 | End: 2024-02-08

## 2024-02-01 RX ORDER — BUPRENORPHINE HYDROCHLORIDE AND NALOXONE HYDROCHLORIDE DIHYDRATE 8; 2 MG/1; MG/1
1 TABLET SUBLINGUAL DAILY
COMMUNITY
Start: 2024-01-31

## 2024-02-01 RX ORDER — NITROFURANTOIN 25; 75 MG/1; MG/1
100 CAPSULE ORAL 2 TIMES DAILY
Qty: 10 CAPSULE | Refills: 0 | Status: SHIPPED | OUTPATIENT
Start: 2024-02-01 | End: 2024-02-06

## 2024-02-01 NOTE — ASSESSMENT & PLAN NOTE
Will start Flagyl and obtain vaginosis panel today.  Patient has had history of recurrent bacterial vaginosis

## 2024-02-01 NOTE — LETTER
[facility]  Vaccine Consent Form    Patient Name:  July Carrington  Patient :  1985     Vaccine(s) Ordered    Pneumococcal Conjugate Vaccine 20-Valent (PCV20)        Screening Checklist  The following questions should be completed prior to vaccination. If you answer “yes” to any question, it does not necessarily mean you should not be vaccinated. It just means we may need to clarify or ask more questions. If a question is unclear, please ask your healthcare provider to explain it.    Yes No   Any fever or moderate to severe illness today (mild illness and/or antibiotic treatment are not contraindications)?     Do you have a history of a serious reaction to any previous vaccinations, such as anaphylaxis, encephalopathy within 7 days, Guillain-Salix syndrome within 6 weeks, seizure?     Have you received any live vaccine(s) (e.g MMR, OBED) or any other vaccines in the last month (to ensure duplicate doses aren't given)?     Do you have an anaphylactic allergy to latex (DTaP, DTaP-IPV, Hep A, Hep B, MenB, RV, Td, Tdap), baker’s yeast (Hep B, HPV), polysorbates (RSV, nirsevimab, PCV 20, Rotavirrus, Tdap, Shingrix), or gelatin (OBED, MMR)?     Do you have an anaphylactic allergy to neomycin (Rabies, OBED, MMR, IPV, Hep A), polymyxin B (IPV), or streptomycin (IPV)?      Any cancer, leukemia, AIDS, or other immune system disorder? (OBED, MMR, RV)     Do you have a parent, brother, or sister with an immune system problem (if immune competence of vaccine recipient clinically verified, can proceed)? (MMR, OBED)     Any recent steroid treatments for >2 weeks, chemotherapy, or radiation treatment? (OBED, MMR)     Have you received antibody-containing blood transfusions or IVIG in the past 11 months (recommended interval is dependent on product)? (MMR, OBED)     Have you taken antiviral drugs (acyclovir, famciclovir, valacyclovir for OBED) in the last 24 or 48 hours, respectively?      Are you pregnant or planning to become  "pregnant within 1 month? (OBED, MMR, HPV, IPV, MenB, Abrexvy; For Hep B- refer to Engerix-B; For RSV - Abrysvo is indicated for 32-36 weeks of pregnancy from September to January)     For infants, have you ever been told your child has had intussusception or a medical emergency involving obstruction of the intestine (Rotavirus)? If not for an infant, can skip this question.         *Ordering Physicians/APC should be consulted if \"yes\" is checked by the patient or guardian above.  I have received, read, and understand the Vaccine Information Statement (VIS) for each vaccine ordered.  I have considered my or my child's health status as well as the health status of my close contacts.  I have taken the opportunity to discuss my vaccine questions with my or my child's health care provider.   I have requested that the ordered vaccine(s) be given to me or my child.  I understand the benefits and risks of the vaccines.  I understand that I should remain in the clinic for 15 minutes after receiving the vaccine(s).  _________________________________________________________  Signature of Patient or Parent/Legal Guardian ____________________  Date     "

## 2024-02-01 NOTE — PROGRESS NOTES
Female Physical Note      Date: 2024   Patient Name: July Carrington  : 1985   MRN: [id number]     Chief Complaint:    Chief Complaint   Patient presents with    Annual Exam     yearly    Back Pain     Lower back pain for awhile. She states she has knots on her back       History of Present Illness: July Carrington is a 38 y.o. female who is here today for their annual health maintenance and physical.  She is also here today to establish care.  She reports she lives at home with her sister and her son.  She currently works part-time at [employer].    Patient reports she has chronic hep C.  She is interested in referral to GI to discuss treatment.  She is due for follow-up viral load today.    Patient states that over the past week she has been having lower back pain and some urinary frequency.  She denies any pain with urination.  Patient does note that she has chronic low back pain after an injury many years ago.  She is currently on MAT therapy.  She reports she is currently on probation and is not interested in any narcotics or muscle relaxers today.  She does note that she has a history of a lipoma in her low back that had to be excised as it began to grow and cause back pain.  She was told that time she had multiple lipomas in her low back and she is wondering if maybe some of these have enlarged.    Patient denies any family history of cancer.  She does report she has a personal history of cervical cancer and has undergone 3 LEEP procedures in the past.  She notes that she was told that she needs a hysterectomy but she has declined this previously.  She states that she has regular cycles however they are becoming more heavy and she has a significant odor during..  She reports that her period was on  of last month.  She is a G3, P3 with 3 C-sections.  She reports she has not been sexually active in the past year.  She is interested in referral to gynecology to discuss a potential  "hysterectomy.  She is also interested in scheduling a mammogram.  She does not follow any specific diet or exercise regimen.      Subjective      Review of Systems:   Review of Systems   Genitourinary:  Positive for frequency and vaginal bleeding.   Musculoskeletal:  Positive for back pain.   All other systems reviewed and are negative.      Past Medical History, Social History, Family History and Care Team were all reviewed with patient and updated as appropriate.     Medications:     Current Outpatient Medications:     buprenorphine-naloxone (SUBOXONE) 8-2 MG per SL tablet, Place 1 tablet under the tongue Daily., Disp: , Rfl:     metroNIDAZOLE (FLAGYL) 500 MG tablet, Take 1 tablet by mouth 2 (Two) Times a Day for 7 days., Disp: 14 tablet, Rfl: 0    nitrofurantoin, macrocrystal-monohydrate, (MACROBID) 100 MG capsule, Take 1 capsule by mouth 2 (Two) Times a Day for 5 days., Disp: 10 capsule, Rfl: 0    Allergies:   Allergies   Allergen Reactions    Penicillins Hives       Immunizations:  Td/Tdap(Booster Q 10 yrs): Declined  Flu (Yearly): Declined    Colorectal Screening:     Last Completed Colonoscopy       This patient has no relevant Health Maintenance data.           Pap:    Last Completed Pap Smear       This patient has no relevant Health Maintenance data.           Mammogram:    Last Completed Mammogram       This patient has no relevant Health Maintenance data.                 PHQ-2 Depression Screening  Little interest or pleasure in doing things? 0-->not at all   Feeling down, depressed, or hopeless? 1-->several days   PHQ-2 Total Score 1            Objective     Physical Exam:  Vital Signs:   Vitals:    02/01/24 1301   BP: 110/74   Pulse: 83   SpO2: 99%   Weight: 76.2 kg (168 lb)   Height: 170.2 cm (67.01\")            Physical Exam  Vitals and nursing note reviewed. Exam conducted with a chaperone present.   Constitutional:       Appearance: Normal appearance. She is normal weight.   HENT:      Head: " Normocephalic and atraumatic.      Nose: Nose normal.      Mouth/Throat:      Mouth: Mucous membranes are moist.   Eyes:      Extraocular Movements: Extraocular movements intact.      Pupils: Pupils are equal, round, and reactive to light.   Cardiovascular:      Rate and Rhythm: Normal rate and regular rhythm.   Pulmonary:      Effort: Pulmonary effort is normal.      Breath sounds: Normal breath sounds.   Abdominal:      General: Abdomen is flat.   Genitourinary:     Vagina: Normal.      Cervix: Normal.      Uterus: Normal.       Adnexa: Right adnexa normal and left adnexa normal.      Rectum: Normal.   Musculoskeletal:         General: Normal range of motion.      Cervical back: Normal range of motion.   Skin:     General: Skin is warm.   Neurological:      General: No focal deficit present.      Mental Status: She is alert and oriented to person, place, and time.   Psychiatric:         Mood and Affect: Mood normal.         Behavior: Behavior normal.         Thought Content: Thought content normal.         Judgment: Judgment normal.         Procedures    Assessment / Plan      Assessment/Plan:   Diagnoses and all orders for this visit:    1. Annual physical exam (Primary)  Assessment & Plan:  Diet and exercise recommendations given.  Immunizations reviewed and updated, patient given Prevnar today.  Pap smear performed today, will check STD screening.    Orders:  -     LIQUID-BASED PAP SMEAR WITH HPV GENOTYPING REGARDLESS OF INTERPRETATION ([facility]); Future  -     Mammo Screening Digital Tomosynthesis Bilateral With CAD; Future  -     HIV-1/O/2 Ag/Ab w Reflex; Future    2. Chronic hepatitis C without hepatic coma  Assessment & Plan:  Will check viral load and genotype today.  Will refer to gastroenterology to discuss treatment.    Orders:  -     CBC (No Diff); Future  -     Comprehensive Metabolic Panel; Future  -     Hemoglobin A1c; Future  -     Lipid Panel; Future  -     TSH; Future  -     HCV RNA By PCR, Qn  Rfx Pat; Future  -     Ambulatory Referral to Gastroenterology    3. Encounter for immunization  -     Pneumococcal Conjugate Vaccine 20-Valent (PCV20)    4. Acute cystitis without hematuria  Assessment & Plan:  Will start Macrobid and obtain urine culture today.    Orders:  -     Urine Culture - Urine, Urine, Clean Catch; Future  -     nitrofurantoin, macrocrystal-monohydrate, (MACROBID) 100 MG capsule; Take 1 capsule by mouth 2 (Two) Times a Day for 5 days.  Dispense: 10 capsule; Refill: 0  -     Urine Culture - Urine, Urine, Clean Catch    5. Chronic bilateral low back pain without sciatica  Assessment & Plan:  Obtain x-ray of the lower back for further evaluation.    Orders:  -     XR Spine Lumbar 2 or 3 View; Future    6. Malignant neoplasm of cervix, unspecified site  Assessment & Plan:  Status post LEEP x 3 times.  Pap smear updated today.  Refer to gynecology for evaluation and treatment.  Patient reports that she was informed she will need a hysterectomy in the future but has previously declined, she would like to discuss that at this time.    Orders:  -     Ambulatory Referral to Gynecology    7. Vaginal discharge  Assessment & Plan:  Will start Flagyl and obtain vaginosis panel today.  Patient has had history of recurrent bacterial vaginosis    Orders:  -     metroNIDAZOLE (FLAGYL) 500 MG tablet; Take 1 tablet by mouth 2 (Two) Times a Day for 7 days.  Dispense: 14 tablet; Refill: 0        Follow Up:   Return in about 1 year (around 2/1/2025) for Annual.    Healthcare Maintenance:   Counseling provided on immunizations, diet and exercise recommendations, preventative screenings..   July Carrington voices understanding and acceptance of this advice and will call back with any further questions or concerns. AVS with preventive healthcare tips printed for patient.     Sonya Stock DO   [facility] [place] [address]

## 2024-02-01 NOTE — ASSESSMENT & PLAN NOTE
Diet and exercise recommendations given.  Immunizations reviewed and updated, patient given Prevnar today.  Pap smear performed today, will check STD screening.

## 2024-02-01 NOTE — ASSESSMENT & PLAN NOTE
Status post LEEP x 3 times.  Pap smear updated today.  Refer to gynecology for evaluation and treatment.  Patient reports that she was informed she will need a hysterectomy in the future but has previously declined, she would like to discuss that at this time.

## 2024-02-02 LAB — TSH SERPL DL<=0.05 MIU/L-ACNC: 1.87 UIU/ML (ref 0.27–4.2)

## 2024-02-03 LAB — BACTERIA SPEC AEROBE CULT: NO GROWTH

## 2024-02-04 LAB
HCV GENTYP SERPL NAA+PROBE: NORMAL
HCV GENTYP SERPL NAA+PROBE: NORMAL
HCV RNA SERPL NAA+PROBE-ACNC: NORMAL IU/ML
HCV RNA SERPL NAA+PROBE-LOG IU: 5.29 LOG10 IU/ML
LABORATORY COMMENT REPORT: NORMAL
LABORATORY COMMENT REPORT: NORMAL

## 2024-02-05 ENCOUNTER — TELEPHONE (OUTPATIENT)
Dept: FAMILY MEDICINE CLINIC | Facility: CLINIC | Age: 39
End: 2024-02-05
Payer: MEDICAID

## 2024-02-06 NOTE — TELEPHONE ENCOUNTER
Spoke with patient and discussed her labs and GI referral.   She would like her phone number as the main contact instead of her sisters. I have changed this to the [phone]

## 2024-02-09 ENCOUNTER — TELEPHONE (OUTPATIENT)
Dept: FAMILY MEDICINE CLINIC | Facility: CLINIC | Age: 39
End: 2024-02-09
Payer: MEDICAID

## 2024-02-09 LAB — REF LAB TEST METHOD: NORMAL

## 2024-08-01 ENCOUNTER — HOSPITAL ENCOUNTER (EMERGENCY)
Facility: HOSPITAL | Age: 39
Discharge: HOME | End: 2024-08-01
Attending: EMERGENCY MEDICINE
Payer: COMMERCIAL

## 2024-08-01 ENCOUNTER — APPOINTMENT (OUTPATIENT)
Dept: GENERAL RADIOLOGY | Facility: HOSPITAL | Age: 39
End: 2024-08-01
Payer: COMMERCIAL

## 2024-08-01 VITALS
WEIGHT: 174.6 LBS | BODY MASS INDEX: 26.46 KG/M2 | SYSTOLIC BLOOD PRESSURE: 125 MMHG | HEART RATE: 62 BPM | TEMPERATURE: 97.9 F | DIASTOLIC BLOOD PRESSURE: 86 MMHG | HEIGHT: 68 IN | RESPIRATION RATE: 20 BRPM | OXYGEN SATURATION: 96 %

## 2024-08-01 DIAGNOSIS — M54.50 ACUTE MIDLINE LOW BACK PAIN WITHOUT SCIATICA: Primary | ICD-10-CM

## 2024-08-01 PROCEDURE — 96372 THER/PROPH/DIAG INJ SC/IM: CPT

## 2024-08-01 PROCEDURE — 25010000002 KETOROLAC TROMETHAMINE PER 15 MG: Performed by: PHYSICIAN ASSISTANT

## 2024-08-01 PROCEDURE — 99283 EMERGENCY DEPT VISIT LOW MDM: CPT

## 2024-08-01 PROCEDURE — 72100 X-RAY EXAM L-S SPINE 2/3 VWS: CPT

## 2024-08-01 RX ORDER — KETOROLAC TROMETHAMINE 30 MG/ML
60 INJECTION, SOLUTION INTRAMUSCULAR; INTRAVENOUS ONCE
Status: COMPLETED | OUTPATIENT
Start: 2024-08-01 | End: 2024-08-01

## 2024-08-01 RX ORDER — CYCLOBENZAPRINE HCL 10 MG
10 TABLET ORAL 3 TIMES DAILY PRN
Qty: 12 TABLET | Refills: 0 | Status: SHIPPED | OUTPATIENT
Start: 2024-08-01

## 2024-08-01 RX ADMIN — KETOROLAC TROMETHAMINE 60 MG: 30 INJECTION, SOLUTION INTRAMUSCULAR; INTRAVENOUS at 16:03

## 2024-08-01 NOTE — ED PROVIDER NOTES
Subjective   History of Present Illness  38-year-old female presents emergency department with lower back pain.  Patient reports has a longstanding history of low back pain but has had no recent trauma or fall.  She denies any loss of bowel or bladder control.  No saddle anesthesia.  She has had no dysuria frequency urgency or hematuria.  Pain is worse with movement.  She has had no previous surgeries.    History provided by:  Patient  Back Pain  Location:  Lumbar spine  Quality:  Shooting and stabbing  Pain severity:  Severe  Onset quality:  Sudden  Duration:  5 days  Timing:  Constant  Progression:  Worsening  Chronicity:  New  Context: twisting    Context: not emotional stress, not MVA, not occupational injury, not recent illness and not recent injury    Relieved by:  Nothing  Ineffective treatments:  None tried  Associated symptoms: no dysuria        Review of Systems   Respiratory:  Negative for chest tightness and shortness of breath.    Genitourinary:  Negative for dysuria, flank pain and urgency.   Musculoskeletal:  Positive for back pain.       Past Medical History:   Diagnosis Date   • Allergic    • Anxiety    • Bipolar disorder    • Cancer     cervical   • Depression    • Ear infection    • Liver disease     Hep C       Allergies   Allergen Reactions   • Penicillins Hives       Past Surgical History:   Procedure Laterality Date   •  SECTION  , ,        No family history on file.    Social History     Socioeconomic History   • Marital status:    Tobacco Use   • Smoking status: Every Day     Current packs/day: 0.50     Average packs/day: 0.5 packs/day for 12.0 years (6.0 ttl pk-yrs)     Types: Cigarettes   • Smokeless tobacco: Never   Substance and Sexual Activity   • Alcohol use: No   • Drug use: Yes     Types: Marijuana, Cocaine(coke)     Comment: UNKNOWN SUBSTANCES           Objective   Physical Exam  Vitals and nursing note reviewed.   Constitutional:       General: She is  not in acute distress.     Appearance: She is well-developed. She is not diaphoretic.   HENT:      Head: Normocephalic and atraumatic.      Nose: Nose normal.   Eyes:      General: No scleral icterus.     Conjunctiva/sclera: Conjunctivae normal.   Cardiovascular:      Rate and Rhythm: Normal rate and regular rhythm.      Heart sounds: Normal heart sounds. No murmur heard.  Pulmonary:      Effort: Pulmonary effort is normal. No respiratory distress.      Breath sounds: Normal breath sounds.   Abdominal:      General: Bowel sounds are normal.      Palpations: Abdomen is soft.      Tenderness: There is no abdominal tenderness.   Musculoskeletal:         General: Normal range of motion.      Cervical back: Normal range of motion and neck supple.      Comments: Tender over both SI joints and along the entire lumbar spine no step-off no crepitus no rash or lesions strength 5/5 bilateral lower extremities.  Skin is warm pink and dry with good perfusion.  Negative straight leg raise bilaterally.   Skin:     General: Skin is warm and dry.   Neurological:      Mental Status: She is alert and oriented to person, place, and time.   Psychiatric:         Behavior: Behavior normal.       Procedures           ED Course  ED Course as of 08/01/24 1710   Thu Aug 01, 2024   1654 X-rays negative for fracture or dislocation. [PERRY]      ED Course User Index  [PERRY] Edgar Storey PA                                   No results found for this or any previous visit (from the past 24 hour(s)).  Note: In addition to lab results from this visit, the labs listed above may include labs taken at another facility or during a different encounter within the last 24 hours. Please correlate lab times with ED admission and discharge times for further clarification of the services performed during this visit.    XR Spine Lumbar 2 or 3 View   Final Result   Impression:   1.Transitional anatomy at the lumbosacral junction with suspected partial  "lumbarization of S1.   2.Mild disc height loss at L5-S1, which appears similar to the CT from 2020.   3.No definite radiographic findings of acute osseous lumbar spine abnormality.            Electronically Signed: Chace Buchanan     8/1/2024 4:03 PM EDT     Workstation ID: [id number]        Vitals:    08/01/24 1441   BP: 125/86   BP Location: Right arm   Patient Position: Sitting   Pulse: 62   Resp: 20   Temp: 97.9 °F (36.6 °C)   TempSrc: Oral   SpO2: 96%   Weight: 79.2 kg (174 lb 9.7 oz)   Height: 171.5 cm (67.5\")     Medications   ketorolac (TORADOL) injection 60 mg (60 mg Intramuscular Given 8/1/24 1603)     ECG/EMG Results (last 24 hours)       ** No results found for the last 24 hours. **          No orders to display                 Medical Decision Making  Amount and/or Complexity of Data Reviewed  Radiology: ordered.    Risk  Prescription drug management.        Final diagnoses:   Acute midline low back pain without sciatica       ED Disposition  ED Disposition       ED Disposition   Discharge    Condition   Stable    Comment   --               PATIENT CONNECTION - [facility] [address]  [phone]             Medication List        New Prescriptions      cyclobenzaprine 10 MG tablet  Commonly known as: FLEXERIL  Take 1 tablet by mouth 3 (Three) Times a Day As Needed for Muscle Spasms.     diclofenac 50 MG EC tablet  Commonly known as: VOLTAREN  Take 1 tablet by mouth 3 (Three) Times a Day.               Where to Get Your Medications        These medications were sent to [facility] - [place], [place] - [address] [facility] - [phone] [facility] [phone] [address] [employer] [facility] [address]      Phone: [phone]   cyclobenzaprine 10 MG tablet  diclofenac 50 MG EC tablet            Edgar Storey PA  08/05/24 1012    "

## 2024-08-01 NOTE — Clinical Note
[facility] EMERGENCY DEPARTMENT  [address]  [facility] [address]  Phone: [phone]    July Carrington was seen and treated in our emergency department on 8/1/2024.  She may return to work on 08/03/2024.         Thank you for choosing [facility].    Edgar Storey PA

## 2025-04-04 PROCEDURE — 87798 DETECT AGENT NOS DNA AMP: CPT | Performed by: PHYSICIAN ASSISTANT

## 2025-04-04 PROCEDURE — 87591 N.GONORRHOEAE DNA AMP PROB: CPT | Performed by: PHYSICIAN ASSISTANT

## 2025-04-04 PROCEDURE — 87661 TRICHOMONAS VAGINALIS AMPLIF: CPT | Performed by: PHYSICIAN ASSISTANT

## 2025-04-04 PROCEDURE — 87529 HSV DNA AMP PROBE: CPT | Performed by: PHYSICIAN ASSISTANT

## 2025-04-04 PROCEDURE — 87801 DETECT AGNT MULT DNA AMPLI: CPT | Performed by: PHYSICIAN ASSISTANT

## 2025-04-04 PROCEDURE — 87491 CHLMYD TRACH DNA AMP PROBE: CPT | Performed by: PHYSICIAN ASSISTANT
